# Patient Record
Sex: MALE | Race: WHITE | NOT HISPANIC OR LATINO | Employment: STUDENT | URBAN - METROPOLITAN AREA
[De-identification: names, ages, dates, MRNs, and addresses within clinical notes are randomized per-mention and may not be internally consistent; named-entity substitution may affect disease eponyms.]

---
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
## 2021-01-03 ENCOUNTER — HEALTH MAINTENANCE LETTER (OUTPATIENT)
Age: 21
End: 2021-01-03

## 2021-03-04 DIAGNOSIS — L30.4 INTERTRIGO: ICD-10-CM

## 2021-03-05 RX ORDER — TRIAMCINOLONE ACETONIDE 0.25 MG/G
CREAM TOPICAL 2 TIMES DAILY
Qty: 80 G | Refills: 0 | Status: SHIPPED | OUTPATIENT
Start: 2021-03-05 | End: 2021-06-21

## 2021-03-05 NOTE — TELEPHONE ENCOUNTER
Last Clinic Visit: 12/21/2020  [facility] [place]  derm Process #4 (RTC 3 M)

## 2021-03-31 PROBLEM — S29.011A STRAIN OF LEFT PECTORALIS MUSCLE: Status: RESOLVED | Noted: 2020-10-28 | Resolved: 2021-03-31

## 2021-06-21 ENCOUNTER — OFFICE VISIT (OUTPATIENT)
Dept: DERMATOLOGY | Facility: CLINIC | Age: 21
End: 2021-06-21
Payer: COMMERCIAL

## 2021-06-21 DIAGNOSIS — Z12.83 SKIN CANCER SCREENING: ICD-10-CM

## 2021-06-21 DIAGNOSIS — L70.0 ACNE VULGARIS: Primary | ICD-10-CM

## 2021-06-21 DIAGNOSIS — L30.4 INTERTRIGO: ICD-10-CM

## 2021-06-21 DIAGNOSIS — D22.9 MULTIPLE BENIGN NEVI: ICD-10-CM

## 2021-06-21 PROCEDURE — 99214 OFFICE O/P EST MOD 30 MIN: CPT | Performed by: PHYSICIAN ASSISTANT

## 2021-06-21 RX ORDER — CLINDAMYCIN PHOSPHATE 10 UG/ML
LOTION TOPICAL AT BEDTIME
Qty: 60 ML | Refills: 11 | Status: SHIPPED | OUTPATIENT
Start: 2021-06-21 | End: 2021-09-27

## 2021-06-21 RX ORDER — NYSTATIN 100000 U/G
OINTMENT TOPICAL 2 TIMES DAILY
Qty: 30 G | Refills: 11 | Status: SHIPPED | OUTPATIENT
Start: 2021-06-21

## 2021-06-21 RX ORDER — TRIAMCINOLONE ACETONIDE 0.25 MG/G
CREAM TOPICAL 2 TIMES DAILY
Qty: 80 G | Refills: 1 | Status: SHIPPED | OUTPATIENT
Start: 2021-06-21 | End: 2021-09-27

## 2021-06-21 ASSESSMENT — PAIN SCALES - GENERAL: PAINLEVEL: NO PAIN (0)

## 2021-06-21 NOTE — PROGRESS NOTES
[facility] Dermatology Record:  Clinic Visit In Office      Dermatology Problem List:  1. Acne vulgaris - mixed inflammatory and comedonal  -Start clindamycin 1% lotion daily to face chest and back  benzoyl peroxide 5% wash, tazarotene 0.1% cream    -s/p doxycycline 100 mg BID,  tazarotene 0.1% gel  S/p course of isotretinoin in high school  2.  History of tinea cruris - terbinafine 1% cream, terbinafine 250 mg daily x4 weeks  -past treatment: fluconazole 150mg qweek x2 weeks  3. Seborrheic dermatitis  -Head & Shoulders and Selsun Blue  4. Intertrigo- Nystatin cream bid and triamcinolone 0.025% cream bid prn   5.  Skin cancer screening 6/21/2021  Encounter Date: Jun 21, 2021    CC:   Chief Complaint   Patient presents with     Derm Problem     aniceto is coming in today for a follow up on acne, states that it has been getting better, also wanting to discuss jock itch as well       History of Present Illness:  Aniceto Issa is a 20 year old male who presents for a follow up of tinea cruris and acne vulgaris and a skin check. I last spoke with him virtually on 12/21/20 when I started dated on nystatin cream twice daily layered with triamcinolone 0.025% cream twice daily in areas in the groin as needed.  He states this helped take the itching away and resolved his symptoms sooner than any other cream or prescription that he has had in the past.  He states that the rashes come and go but when he uses these creams they go away quickly.  He would like refills of these.    For the acne he was prescribed Tazorac 0.1% cream.  He states this is working fairly well.  He continues to use a benzyl peroxide wash in the shower.  He still has some breakouts but is not too concerned about these.  He would like to avoid oral medication for now.    He would like a full skin check to evaluate his moles.  He denies any nevi that are changing, growing, bleeding or symptomatic.        ROS: Patient is generally feeling well today, no other  skin concerns. No rashes to other areas.     Physical Examination:  General: Well-appearing 20 year old male, appropriately-developed individual.  Skin:  Full skin, which includes the head/face, both arms, chest, back, abdomen,both legs, genitalia and/or groin buttocks, digits and/or nails, was examined.  -There are faint pink patches to inguinal folds without scale   -There are a few pink papules noted to the upper back/ posterior shoulders, cheeks  -Scattered round, brown symmetric macules and papules to the face, trunk and extremities.    Labs:  Liver Function Studies -   Recent Labs   Lab Test 11/09/20  0649   PROTTOTAL 7.4   ALBUMIN 4.1   BILITOTAL 0.3   ALKPHOS 74   AST 29   ALT 41         Past Medical History:   Patient Active Problem List   Diagnosis     Acne vulgaris     Past Medical History:   Diagnosis Date     Acne      No past surgical history on file.    Social History:  Patient reports that he has never smoked. He has never used smokeless tobacco.    Family History:  Family History   Problem Relation Age of Onset     Melanoma Maternal Grandfather      Skin Cancer No family hx of        Medications:  Current Outpatient Medications   Medication     benzoyl peroxide 5 % external liquid     nystatin (MYCOSTATIN) 115848 UNIT/GM external cream     tazarotene (TAZORAC) 0.1 % external cream     TAZORAC 0.1 % external gel     triamcinolone (KENALOG) 0.025 % cream     No current facility-administered medications for this visit.           No Known Allergies        Impression and Recommendations (Patient Counseled on the Following):  1.  Intertrigo, status post treatment of tinea cruris.  Consider this more intertrigo at this point with history of pruritus and irritation.  -Continue Nystatin cream bid   -Continue triamcinolone 0.025% cream bid only during flares of pruritus, up to 2 weeks at at time. Steroid ed given.    2. Acne vulgaris, primarily his back, a few areas on his face.   S/p course of isotretinoin  in high school and most recently doxycycline.   Prefers to use only topicals.  -Start clindamycin 1% lotion daily to the face, chest and back   -Continue benzoyl peroxide 5% wash daily  -Continue Tazarotene 0.1% cream nightly to the back.     3.  Skin cancer screening today with multiple benign pigmented nevi  -No concerning nevi noted on exam or on dermoscopy.  -ABCDs of melanoma were discussed and self skin checks were advised.   -Sun precaution was advised including the use of sun screens of SPF 30 or higher, sun protective clothing, and avoidance of tanning beds.    Follow-up:   Follow-up with dermatology annually if requiring refills, otherwise recommend f/u within 3 months. Pt defers close follow up at this time.    Staff only    All risks, benefits and alternatives were discussed with patient.  Patient is in agreement and understands the assessment and plan.  All questions were answered.  Sun Screen Education was given.   Return to Clinic annually or sooner as needed.   Rachel Soliz PA-C

## 2021-06-21 NOTE — PATIENT INSTRUCTIONS
SUN PROTECTION    WHY PROTECT AGAINST THE SUN?  In the past, sun exposure was thought to be a healthy benefit of outdoor activity. However, studies have shown many unhealthy effects of sun exposure, such as early aging of the skin and skin cancer.    WHAT KIND OF DAMAGE DOES THE SUN EXPOSURE CAUSE?  Part of the sun s energy that reaches earth is composed of rays of invisible ultraviolet (UV) light. When ultraviolet light rays (UVA and UVB) enter the skin, they damage skin cells, causing visible and invisible injuries.    Sunburn is a visible type of damage, which appears just a few hours after sun exposure. In many people this type of damage also causes tanning. Freckles, which occur in people with fair skin, are usually due to sun exposure. Freckles are nearly always a sign that sun damage has occurred, and therefore show the need for sun protection.    Ultraviolet light rays also cause invisible damage to skin cells. Some of the injury is repaired but some of the cell damage adds up year after year. After 20-30 years or more, the built-up damage appears as wrinkles, age spots and even skin cancer.  Although window glass blocks UVB light, UVA rays are able to penetrate through the glass.    HOW CAN I PROTECT MY CHILD FROM EXCESSIVE SUN EXPOSURE?  1. Avoidance. Plan your activities to avoid being in the sun in the middle of the day. Sun exposure is more intense closer to the equator, in the mountains and in the summer. The sun s damaging effects are increased by reflection from water, white sand and snow. Avoid long periods of direct sun exposure. Sit or play in the shade, especially when your shadow is shorter then you are tall. Stay out of the sun during peak hours of 10 am - 2 pm.   2. Use protective clothing.  Cover up with light colored clothing when outdoors including a hat to protect the scalp and face. In addition to filtering out the sun, tightly woven clothing reflects heat and helps keep you feeling cool.  Sunglasses that block ultraviolet rays protect the eyes and eyelids. Multiple retailers now sell clothing and swimwear for adults and children that is made of special fabric that protects against the sun.    3. Apply a broad-spectrum UVA and UVB sunscreen with an SPF of 30 of higher and reapply approximately every two hours, even on cloudy days. If swimming or participating in intense physical activity, sunscreen may need to be applied more often.   4. Infants should be kept out of direct sun and be covered by protective clothing when possible. If sun exposure is unavoidable, sunscreen should be applied to exposed areas (i.e. face, hands).    IS SUNSCREEN SAFE?  Hats, clothing and shade are the most reliable forms of sun protection, but sunscreen is also an important part of protecting your child from the sun. Some have raised concerns about chemical sunscreens and the dangers of absorption. Most of this concern is theoretical, and our providers would be happy to discuss this with you.  Most dermatologists agree that the risk of unprotected sun exposure far outweighs the theoretical risks of sunscreens.      WHAT IF I HAVE AN INFANT OR YOUNG CHILD WITH SENSITIVE SKIN?  The following sunscreens may be better for your child s sensitive skin. The main active ingredients are inert, either titanium dioxide or zinc oxide. These ingredients are less irritating than chemical sunscreens.   Be wary of the word  baby  or  organic : these words don t always mean that the product is hypoallergenic.  Please also note that this list is not all-inclusive, and that we do not formally endorse any of these products.     Aveeno Active Natural Protection Mineral Block Lotion SPF 30  Aveeno Baby Natural Protection Face Stick SPF 50+  Banana Boat Natural Reflect (baby or kids) SPF 50+  Bare Republic SPR 50 Stick   Beauty Countersun Mineral Sunscreen Stick SPF 30  Wallace s Bees Chemical-Free Sunscreen SPF 30  Blue Lizard Baby SPF 30+  Blue  Lizard for Sensitive Skin SPF 30+  Cotz Pediatric Pure SPF 30  Cotz Pediatric Face SPF 40  Cotz 20% Zinc SPF 35  CVS Sensitive Skin 30  CVS Baby Lotion Sunscreen SPF 60+  EltaMD UV Physical Broad-Spectrum SPF 41  La Roche-Posay Anthelios Mineral Zinc Oxide Sunscreen SPF 50  Mustella Broad Spectrum SPF 50+/Mineral Sunscreen Stick  Neutrogena Sensitive Skin- Pure and Free Baby SPF 30  Neutrogena Sensitive Skin-Pure and Free Baby  SPF 50+  Neutrogena Sheer Zinc Oxide Dry-Touch Face Sunscreen with Broad Spectrum SPF 50, Oil-Free, Non-Comedogenic & Non-Greasy Mineral Sunscreen  Thinkbaby Safe Sunscreen SPF 50+,   Thinksport Sunscreen SPF 50+,   PreSun Sensitive Sunblock SPF 28  Vanicream Sunscreen for Sensitive Skin SPF 30 or 50  [employer]green s Sensitive Skin SPF 70    WHERE CAN I BUY SUN PROTECTIVE CLOTHING AND SWIMWEAR?   Many retailers sell these products.  Coolibar, Solumbra, Sunday Afternoons, and Athleta are some examples.  Many other popular children s brands have started selling UV protective swimwear, and we recommend swimsuits that include swim shirts and don t leave extra skin exposed.   UV protective products can also be washed into clothing (eg: Rit Sun Guard Laundry UV Protectant).     SHOULD I WORRY ABOUT MY CHILD NOT GETTING ENOUGH VITAMIN D?  Vitamin D is essential for many processes in the body, and it is important for bone growth in children.  But while the sun is one source of vitamin D, it is also the source of harmful ultraviolet radiation resulting in thousands of skin cancers each year. The official recommendation of the American Academy of Dermatology (AAD) is that vitamin D should be obtained through dietary sources and supplementation rather than from sunlight.     For more information on sun safety and more FAQs about sun protection, visit:  http://www.aad.org/media-resources/stats-and-facts/prevention-and-care/sunscreens

## 2021-06-21 NOTE — NURSING NOTE
Dermatology Rooming Note    Aniceto Issa's goals for this visit include:   Chief Complaint   Patient presents with     Derm Problem     aniceto is coming in today for a follow up on acne, states that it has been getting better, also wanting to discuss jock itch as well     Emerita Alvarez CMA on 6/21/2021 at 11:11 AM

## 2021-06-21 NOTE — LETTER
6/21/2021       RE: Aniceto Issa  [address] [id number]     Dear Colleague,    Thank you for referring your patient, Aniceto Issa, to the [facility] [place] at [facility]. Please see a copy of my visit note below.    [facility] Dermatology Record:  Clinic Visit In Office      Dermatology Problem List:  1. Acne vulgaris - mixed inflammatory and comedonal  -Start clindamycin 1% lotion daily to face chest and back  benzoyl peroxide 5% wash, tazarotene 0.1% cream    -s/p doxycycline 100 mg BID,  tazarotene 0.1% gel  S/p course of isotretinoin in high school  2.  History of tinea cruris - terbinafine 1% cream, terbinafine 250 mg daily x4 weeks  -past treatment: fluconazole 150mg qweek x2 weeks  3. Seborrheic dermatitis  -Head & Shoulders and Selsun Blue  4. Intertrigo- Nystatin cream bid and triamcinolone 0.025% cream bid prn   5.  Skin cancer screening 6/21/2021  Encounter Date: Jun 21, 2021    CC:   Chief Complaint   Patient presents with     Derm Problem     aniceto is coming in today for a follow up on acne, states that it has been getting better, also wanting to discuss jock itch as well       History of Present Illness:  Aniceto Issa is a 20 year old male who presents for a follow up of tinea cruris and acne vulgaris and a skin check. I last spoke with him virtually on 12/21/20 when I started dated on nystatin cream twice daily layered with triamcinolone 0.025% cream twice daily in areas in the groin as needed.  He states this helped take the itching away and resolved his symptoms sooner than any other cream or prescription that he has had in the past.  He states that the rashes come and go but when he uses these creams they go away quickly.  He would like refills of these.    For the acne he was prescribed Tazorac 0.1% cream.  He states this is working fairly well.  He continues to use a benzyl peroxide wash in the shower.   He still has some breakouts but is not too concerned about these.  He would like to avoid oral medication for now.    He would like a full skin check to evaluate his moles.  He denies any nevi that are changing, growing, bleeding or symptomatic.        ROS: Patient is generally feeling well today, no other skin concerns. No rashes to other areas.     Physical Examination:  General: Well-appearing 20 year old male, appropriately-developed individual.  Skin:  Full skin, which includes the head/face, both arms, chest, back, abdomen,both legs, genitalia and/or groin buttocks, digits and/or nails, was examined.  -There are faint pink patches to inguinal folds without scale   -There are a few pink papules noted to the upper back/ posterior shoulders, cheeks  -Scattered round, brown symmetric macules and papules to the face, trunk and extremities.    Labs:  Liver Function Studies -   Recent Labs   Lab Test 11/09/20  0649   PROTTOTAL 7.4   ALBUMIN 4.1   BILITOTAL 0.3   ALKPHOS 74   AST 29   ALT 41         Past Medical History:   Patient Active Problem List   Diagnosis     Acne vulgaris     Past Medical History:   Diagnosis Date     Acne      No past surgical history on file.    Social History:  Patient reports that he has never smoked. He has never used smokeless tobacco.    Family History:  Family History   Problem Relation Age of Onset     Melanoma Maternal Grandfather      Skin Cancer No family hx of        Medications:  Current Outpatient Medications   Medication     benzoyl peroxide 5 % external liquid     nystatin (MYCOSTATIN) 994649 UNIT/GM external cream     tazarotene (TAZORAC) 0.1 % external cream     TAZORAC 0.1 % external gel     triamcinolone (KENALOG) 0.025 % cream     No current facility-administered medications for this visit.           No Known Allergies        Impression and Recommendations (Patient Counseled on the Following):  1.  Intertrigo, status post treatment of tinea cruris.  Consider this more  intertrigo at this point with history of pruritus and irritation.  -Continue Nystatin cream bid   -Continue triamcinolone 0.025% cream bid only during flares of pruritus, up to 2 weeks at at time. Steroid ed given.    2. Acne vulgaris, primarily his back, a few areas on his face.   S/p course of isotretinoin in high school and most recently doxycycline.   Prefers to use only topicals.  -Start clindamycin 1% lotion daily to the face, chest and back   -Continue benzoyl peroxide 5% wash daily  -Continue Tazarotene 0.1% cream nightly to the back.     3.  Skin cancer screening today with multiple benign pigmented nevi  -No concerning nevi noted on exam or on dermoscopy.  -ABCDs of melanoma were discussed and self skin checks were advised.   -Sun precaution was advised including the use of sun screens of SPF 30 or higher, sun protective clothing, and avoidance of tanning beds.    Follow-up:   Follow-up with dermatology annually if requiring refills, otherwise recommend f/u within 3 months. Pt defers close follow up at this time.    Staff only    All risks, benefits and alternatives were discussed with patient.  Patient is in agreement and understands the assessment and plan.  All questions were answered.  Sun Screen Education was given.   Return to Clinic annually or sooner as needed.   Rachel Soliz PA-C

## 2021-09-26 ENCOUNTER — TELEPHONE (OUTPATIENT)
Dept: FAMILY MEDICINE | Facility: CLINIC | Age: 21
End: 2021-09-26

## 2021-09-26 NOTE — TELEPHONE ENCOUNTER
Patient made a appt on [facility] for 9-28-21. Would you like to triage before he comes in?    Mild discomfort in chest area over the past week, likely muscular but could be something else. Prefer to get it checked out though just to be safe.

## 2021-09-27 ENCOUNTER — OFFICE VISIT (OUTPATIENT)
Dept: INTERNAL MEDICINE | Facility: CLINIC | Age: 21
End: 2021-09-27
Payer: COMMERCIAL

## 2021-09-27 VITALS
TEMPERATURE: 98 F | SYSTOLIC BLOOD PRESSURE: 144 MMHG | OXYGEN SATURATION: 100 % | WEIGHT: 229 LBS | DIASTOLIC BLOOD PRESSURE: 78 MMHG | HEART RATE: 85 BPM | BODY MASS INDEX: 32.86 KG/M2

## 2021-09-27 DIAGNOSIS — R07.9 CHEST PAIN, UNSPECIFIED TYPE: Primary | ICD-10-CM

## 2021-09-27 DIAGNOSIS — Z20.822 ENCOUNTER FOR LABORATORY TESTING FOR COVID-19 VIRUS: ICD-10-CM

## 2021-09-27 LAB
ALBUMIN SERPL-MCNC: 4.5 G/DL (ref 3.4–5)
ALP SERPL-CCNC: 85 U/L (ref 40–150)
ALT SERPL W P-5'-P-CCNC: 66 U/L (ref 0–70)
ANION GAP SERPL CALCULATED.3IONS-SCNC: 2 MMOL/L (ref 3–14)
AST SERPL W P-5'-P-CCNC: 47 U/L (ref 0–45)
BILIRUB SERPL-MCNC: 0.4 MG/DL (ref 0.2–1.3)
BUN SERPL-MCNC: 21 MG/DL (ref 7–30)
CALCIUM SERPL-MCNC: 8.9 MG/DL (ref 8.5–10.1)
CHLORIDE BLD-SCNC: 106 MMOL/L (ref 94–109)
CO2 SERPL-SCNC: 27 MMOL/L (ref 20–32)
CREAT SERPL-MCNC: 1.01 MG/DL (ref 0.66–1.25)
ERYTHROCYTE [DISTWIDTH] IN BLOOD BY AUTOMATED COUNT: 12.7 % (ref 10–15)
GFR SERPL CREATININE-BSD FRML MDRD: >90 ML/MIN/1.73M2
GLUCOSE BLD-MCNC: 103 MG/DL (ref 70–99)
HCT VFR BLD AUTO: 44.9 % (ref 40–53)
HGB BLD-MCNC: 15.3 G/DL (ref 13.3–17.7)
MCH RBC QN AUTO: 28.4 PG (ref 26.5–33)
MCHC RBC AUTO-ENTMCNC: 34.1 G/DL (ref 31.5–36.5)
MCV RBC AUTO: 84 FL (ref 78–100)
PLATELET # BLD AUTO: 266 10E3/UL (ref 150–450)
POTASSIUM BLD-SCNC: 4.2 MMOL/L (ref 3.4–5.3)
PROT SERPL-MCNC: 8.1 G/DL (ref 6.8–8.8)
RBC # BLD AUTO: 5.38 10E6/UL (ref 4.4–5.9)
SARS-COV-2 RNA RESP QL NAA+PROBE: NEGATIVE
SODIUM SERPL-SCNC: 135 MMOL/L (ref 133–144)
TROPONIN I SERPL-MCNC: <0.015 UG/L (ref 0–0.04)
TSH SERPL DL<=0.005 MIU/L-ACNC: 1.26 MU/L (ref 0.4–4)
WBC # BLD AUTO: 6.3 10E3/UL (ref 4–11)

## 2021-09-27 PROCEDURE — 99214 OFFICE O/P EST MOD 30 MIN: CPT | Performed by: INTERNAL MEDICINE

## 2021-09-27 PROCEDURE — U0003 INFECTIOUS AGENT DETECTION BY NUCLEIC ACID (DNA OR RNA); SEVERE ACUTE RESPIRATORY SYNDROME CORONAVIRUS 2 (SARS-COV-2) (CORONAVIRUS DISEASE [COVID-19]), AMPLIFIED PROBE TECHNIQUE, MAKING USE OF HIGH THROUGHPUT TECHNOLOGIES AS DESCRIBED BY CMS-2020-01-R: HCPCS | Performed by: INTERNAL MEDICINE

## 2021-09-27 PROCEDURE — 84484 ASSAY OF TROPONIN QUANT: CPT | Performed by: INTERNAL MEDICINE

## 2021-09-27 PROCEDURE — 80053 COMPREHEN METABOLIC PANEL: CPT | Performed by: INTERNAL MEDICINE

## 2021-09-27 PROCEDURE — 84443 ASSAY THYROID STIM HORMONE: CPT | Performed by: INTERNAL MEDICINE

## 2021-09-27 PROCEDURE — 85027 COMPLETE CBC AUTOMATED: CPT | Performed by: INTERNAL MEDICINE

## 2021-09-27 PROCEDURE — U0005 INFEC AGEN DETEC AMPLI PROBE: HCPCS | Performed by: INTERNAL MEDICINE

## 2021-09-27 PROCEDURE — 93000 ELECTROCARDIOGRAM COMPLETE: CPT | Performed by: INTERNAL MEDICINE

## 2021-09-27 PROCEDURE — 36415 COLL VENOUS BLD VENIPUNCTURE: CPT | Performed by: INTERNAL MEDICINE

## 2021-09-27 NOTE — PATIENT INSTRUCTIONS
- I will send you a message on [employer] when I am able to look at the results of your tests from today  - Go to ER if this worsens

## 2021-09-27 NOTE — PROGRESS NOTES
Assessment & Plan     Chest pain, unspecified type  DDX includes cardiac disease vs MSK vs pulm vs other. EKG not 100% normal today with V1 and V2 showing what appears to be J-point elevation. Discussed this finding with Aniceto today who was understandably concerned about an EKG abnormality. Reassured him that J-point elevation can be a normal finding particularly in young, healthy males such as Aniceto. Offered to check troponin to ensure he's not having active cardiac ischemia which he preferred to pursue. Discussed ER precautions. Will also check other basic labs given report of some palpitations. I think this is most likely MSK pain in a very active young man but will do due diligence here.  - EKG 12-lead complete w/read - Clinics  - CBC with platelets  - Troponin I  - TSH with free T4 reflex  - Comprehensive metabolic panel    Encounter for laboratory testing for COVID-19 virus  Aniceto is quite concerned this could be COVID. He is fully vaccinated. He is traveling on Thursday and would like to be sure. COVID-19 PCR test collected by [facility] staff. I do not expect this is COVID-induced myocarditis given the lack of EKG findings for myocarditis.  - Asymptomatic COVID-19 Virus (Coronavirus) by PCR Nasopharyngeal    Return in about 6 months (around 3/27/2022) for Physical Exam.    Jeremy Perez MD  [facility]   Aniceto is a 21 year old who presents for the following health issues:    Chest Pain  Onset/Duration: 4-5 days  Description:   Location: left side  Character: achey  Radiation: sometimes to R side  Duration: many hours     Intensity: mild  Progression of Symptoms: intermittent  Accompanying Signs & Symptoms:  Shortness of breath: no  Sweating: no  Nausea/vomiting: no  Lightheadedness: no  Palpitations: once or twice  Fever/Chills: no  Cough: no           Heartburn: yes  History:   Family history of heart disease: no  Tobacco use: no  Previous similar symptoms: no    Precipitating factors:   Worse with exertion: no  Worse with deep breaths: no           Related to eating: no           Better with burping: no  Therapies tried and outcome: nothing     Never had anything like this before. No FH of cardiac disease. Pain present during exam. Reproducible with palpation over L lateral rib cage but also present over L anterior chest. He's concerned about COVID.    Review of Systems   Constitutional, HEENT, cardiovascular, pulmonary, MSK systems are negative, except as otherwise noted.      Objective    BP (!) 144/78   Pulse 85   Temp 98  F (36.7  C) (Tympanic)   Wt 103.9 kg (229 lb)   SpO2 100%   BMI 32.86 kg/m    Body mass index is 32.86 kg/m .     Physical Exam   GENERAL: alert and in no distress.  EYES: conjunctivae/corneas clear. EOMs grossly intact  HENT: NC/AT, facies symmetric.  RESP: CTAB, no w/r/r.  CV: RRR, no m/r/g.  GI: NT, ND  MSK: Some mild TTP over L lateral rib cage. Moves all four extremities freely.  SKIN: No significant ulcers, lesions, or rashes on the visualized portions of the skin  NEURO: Alert. Oriented.

## 2021-10-10 ENCOUNTER — HEALTH MAINTENANCE LETTER (OUTPATIENT)
Age: 21
End: 2021-10-10

## 2022-01-05 ENCOUNTER — OFFICE VISIT (OUTPATIENT)
Dept: DERMATOLOGY | Facility: CLINIC | Age: 22
End: 2022-01-05
Payer: COMMERCIAL

## 2022-01-05 VITALS
HEART RATE: 84 BPM | WEIGHT: 229 LBS | SYSTOLIC BLOOD PRESSURE: 129 MMHG | DIASTOLIC BLOOD PRESSURE: 61 MMHG | BODY MASS INDEX: 32.86 KG/M2

## 2022-01-05 DIAGNOSIS — L70.0 ACNE VULGARIS: Primary | ICD-10-CM

## 2022-01-05 DIAGNOSIS — L30.9 DERMATITIS: ICD-10-CM

## 2022-01-05 DIAGNOSIS — L30.4 INTERTRIGO: ICD-10-CM

## 2022-01-05 PROCEDURE — 88312 SPECIAL STAINS GROUP 1: CPT | Performed by: DERMATOLOGY

## 2022-01-05 PROCEDURE — 99213 OFFICE O/P EST LOW 20 MIN: CPT | Mod: 25 | Performed by: PHYSICIAN ASSISTANT

## 2022-01-05 PROCEDURE — 11102 TANGNTL BX SKIN SINGLE LES: CPT | Performed by: PHYSICIAN ASSISTANT

## 2022-01-05 PROCEDURE — 88305 TISSUE EXAM BY PATHOLOGIST: CPT | Performed by: DERMATOLOGY

## 2022-01-05 RX ORDER — TAZAROTENE 1 MG/G
GEL TOPICAL
Qty: 30 G | Refills: 4 | Status: SHIPPED | OUTPATIENT
Start: 2022-01-05

## 2022-01-05 NOTE — LETTER
1/5/2022         RE: Aniceto Issa  [address]  [place] [address]        Dear Colleague,    Thank you for referring your patient, Aniceto Issa, to the [facility]. Please see a copy of my visit note below.    Aniceto Issa is an extremely pleasant 21 year old year old male patient here today for rash on groin. Present for many years. He has tried fluconazole, oral lamisil, topical lamisil, nystatin and triamcinolone. He notes at one time it resolved for a few weeks but then returned. He reports occasionally itchy, he notes triamcinolone does help with the itch. He has had similar rash on armpits in the past, but has not had the return for years. He also notes acne is doing well must uses tazorac.  Patient has no other skin complaints today.  Remainder of the HPI, Meds, PMH, Allergies, FH, and SH was reviewed in chart.    Past Medical History:   Diagnosis Date     Acne        No past surgical history on file.     Family History   Problem Relation Age of Onset     Melanoma Maternal Grandfather      Skin Cancer No family hx of        Social History     Socioeconomic History     Marital status: Single     Spouse name: Not on file     Number of children: Not on file     Years of education: Not on file     Highest education level: Not on file   Occupational History     Not on file   Tobacco Use     Smoking status: Never Smoker     Smokeless tobacco: Never Used   Substance and Sexual Activity     Alcohol use: Not on file     Drug use: Not on file     Sexual activity: Not on file   Other Topics Concern     Not on file   Social History Narrative     Not on file     Social Determinants of Health     Financial Resource Strain: Not on file   Food Insecurity: Not on file   Transportation Needs: Not on file   Physical Activity: Not on file   Stress: Not on file   Social Connections: Not on file   Intimate Partner Violence: Not on file   Housing Stability: Not on file        Outpatient Encounter Medications as of 1/5/2022   Medication Sig Dispense Refill     nystatin (MYCOSTATIN) 083741 UNIT/GM external ointment Apply topically 2 times daily To areas of rash until clear. 30 g 11     tazarotene (TAZORAC) 0.1 % external gel Apply pea sized amount at bedtime. 30 g 4     No facility-administered encounter medications on file as of 1/5/2022.             O:   NAD, WDWN, Alert & Oriented, Mood & Affect wnl, Vitals stable   Here today alone   /61   Pulse 84   Wt 103.9 kg (229 lb)   BMI 32.86 kg/m     General appearance normal   Vitals stable   Alert, oriented and in no acute distress     Pink scaly plaques on right and left groin, no satellite lesions   Rare inflammatory papules on face     Eyes: Conjunctivae/lids:Normal     ENT: Lipsnormal    MSK:Normal    Pulm: Breathing Normal    Neuro/Psych: Orientation:Alert and Orientedx3 ; Mood/Affect:normal   A/P:  1. R/O inverse psoriasis vs tinea, favor psoriasis  TANGENTIAL BIOPSY SENT OUT:  After consent, anesthesia with LEC and prep, tangential excision performed and specimen sent out for permanent section histology.  No complications and routine wound care. Patient told to call our office in 1-2 weeks for result.    If consistent with psoriasis will send in stronger topical steroid and dovonex.     2. Acne Vulgaris   Continue tazorac gel.       Again, thank you for allowing me to participate in the care of your patient.        Sincerely,        Danitza Rubio PA-C

## 2022-01-05 NOTE — PROGRESS NOTES
Aniceto Issa is an extremely pleasant 21 year old year old male patient here today for rash on groin. Present for many years. He has tried fluconazole, oral lamisil, topical lamisil, nystatin and triamcinolone. He notes at one time it resolved for a few weeks but then returned. He reports occasionally itchy, he notes triamcinolone does help with the itch. He has had similar rash on armpits in the past, but has not had the return for years. He also notes acne is doing well must uses tazorac.  Patient has no other skin complaints today.  Remainder of the HPI, Meds, PMH, Allergies, FH, and SH was reviewed in chart.    Past Medical History:   Diagnosis Date     Acne        No past surgical history on file.     Family History   Problem Relation Age of Onset     Melanoma Maternal Grandfather      Skin Cancer No family hx of        Social History     Socioeconomic History     Marital status: Single     Spouse name: Not on file     Number of children: Not on file     Years of education: Not on file     Highest education level: Not on file   Occupational History     Not on file   Tobacco Use     Smoking status: Never Smoker     Smokeless tobacco: Never Used   Substance and Sexual Activity     Alcohol use: Not on file     Drug use: Not on file     Sexual activity: Not on file   Other Topics Concern     Not on file   Social History Narrative     Not on file     Social Determinants of Health     Financial Resource Strain: Not on file   Food Insecurity: Not on file   Transportation Needs: Not on file   Physical Activity: Not on file   Stress: Not on file   Social Connections: Not on file   Intimate Partner Violence: Not on file   Housing Stability: Not on file       Outpatient Encounter Medications as of 1/5/2022   Medication Sig Dispense Refill     nystatin (MYCOSTATIN) 647457 UNIT/GM external ointment Apply topically 2 times daily To areas of rash until clear. 30 g 11     tazarotene (TAZORAC) 0.1 % external gel Apply pea  sized amount at bedtime. 30 g 4     No facility-administered encounter medications on file as of 1/5/2022.             O:   NAD, WDWN, Alert & Oriented, Mood & Affect wnl, Vitals stable   Here today alone   /61   Pulse 84   Wt 103.9 kg (229 lb)   BMI 32.86 kg/m     General appearance normal   Vitals stable   Alert, oriented and in no acute distress     Pink scaly plaques on right and left groin, no satellite lesions   Rare inflammatory papules on face     Eyes: Conjunctivae/lids:Normal     ENT: Lipsnormal    MSK:Normal    Pulm: Breathing Normal    Neuro/Psych: Orientation:Alert and Orientedx3 ; Mood/Affect:normal   A/P:  1. R/O inverse psoriasis vs tinea, favor psoriasis  TANGENTIAL BIOPSY SENT OUT:  After consent, anesthesia with LEC and prep, tangential excision performed and specimen sent out for permanent section histology.  No complications and routine wound care. Patient told to call our office in 1-2 weeks for result.    If consistent with psoriasis will send in stronger topical steroid and dovonex.     2. Acne Vulgaris   Continue tazorac gel.

## 2022-01-12 LAB
PATH REPORT.COMMENTS IMP SPEC: NORMAL
PATH REPORT.FINAL DX SPEC: NORMAL
PATH REPORT.GROSS SPEC: NORMAL
PATH REPORT.MICROSCOPIC SPEC OTHER STN: NORMAL
PATH REPORT.RELEVANT HX SPEC: NORMAL

## 2022-01-14 RX ORDER — TRIAMCINOLONE ACETONIDE 1 MG/G
OINTMENT TOPICAL
Qty: 30 G | Refills: 1 | Status: SHIPPED | OUTPATIENT
Start: 2022-01-14

## 2022-01-30 ENCOUNTER — HEALTH MAINTENANCE LETTER (OUTPATIENT)
Age: 22
End: 2022-01-30

## 2022-06-30 ENCOUNTER — LAB (OUTPATIENT)
Dept: URGENT CARE | Facility: URGENT CARE | Age: 22
End: 2022-06-30
Attending: FAMILY MEDICINE
Payer: COMMERCIAL

## 2022-06-30 DIAGNOSIS — Z20.822 SUSPECTED 2019 NOVEL CORONAVIRUS INFECTION: ICD-10-CM

## 2022-06-30 PROCEDURE — U0003 INFECTIOUS AGENT DETECTION BY NUCLEIC ACID (DNA OR RNA); SEVERE ACUTE RESPIRATORY SYNDROME CORONAVIRUS 2 (SARS-COV-2) (CORONAVIRUS DISEASE [COVID-19]), AMPLIFIED PROBE TECHNIQUE, MAKING USE OF HIGH THROUGHPUT TECHNOLOGIES AS DESCRIBED BY CMS-2020-01-R: HCPCS

## 2022-06-30 PROCEDURE — U0005 INFEC AGEN DETEC AMPLI PROBE: HCPCS

## 2022-07-01 LAB — SARS-COV-2 RNA RESP QL NAA+PROBE: POSITIVE

## 2022-09-18 ENCOUNTER — HEALTH MAINTENANCE LETTER (OUTPATIENT)
Age: 22
End: 2022-09-18

## 2023-05-07 ENCOUNTER — HEALTH MAINTENANCE LETTER (OUTPATIENT)
Age: 23
End: 2023-05-07

## 2023-05-11 ENCOUNTER — OFFICE VISIT (OUTPATIENT)
Dept: DERMATOLOGY | Facility: CLINIC | Age: 23
End: 2023-05-11
Payer: COMMERCIAL

## 2023-05-11 DIAGNOSIS — L40.9 PSORIASIS: Primary | ICD-10-CM

## 2023-05-11 DIAGNOSIS — L21.9 DERMATITIS, SEBORRHEIC: ICD-10-CM

## 2023-05-11 PROCEDURE — 99214 OFFICE O/P EST MOD 30 MIN: CPT | Performed by: NURSE PRACTITIONER

## 2023-05-11 RX ORDER — KETOCONAZOLE 20 MG/G
CREAM TOPICAL
Qty: 60 G | Refills: 11 | Status: SHIPPED | OUTPATIENT
Start: 2023-05-11

## 2023-05-11 RX ORDER — PIMECROLIMUS 10 MG/G
CREAM TOPICAL 2 TIMES DAILY
Qty: 60 G | Refills: 11 | Status: SHIPPED | OUTPATIENT
Start: 2023-05-11

## 2023-05-11 RX ORDER — TRIAMCINOLONE ACETONIDE 1 MG/G
CREAM TOPICAL 2 TIMES DAILY
Qty: 45 G | Refills: 1 | Status: SHIPPED | OUTPATIENT
Start: 2023-05-11 | End: 2024-02-23

## 2023-05-11 NOTE — PATIENT INSTRUCTIONS
Start the triamcinolone cream twice daily for 2 weeks to affected area in genitals, then transition onto the pimecrolimus cream ongoing twice daily until resolved then stop. Restart with flares. Start ketoconazole cream once daily to the face and genitals where rash is present. If this goes away then you may want to use it twice weekly (or more if needed) to keep them from flaring.

## 2023-05-11 NOTE — Clinical Note
5/11/2023         RE: Aniceto Issa  [address]  [place] [address]        Dear Colleague,    Thank you for referring your patient, Aniceto Issa, to the [facility]. Please see a copy of my visit note below.    [facility] Dermatology Note  Encounter Date: May 11, 2023  Office Visit     Reviewed patients past medical history and pertinent chart review prior to patients visit today.     Dermatology Problem List:  Genital Biopsy taken on 1/5/2022 indicating intertrigo versus psoriasis. Currently using ketoconazole 2% cream and triamcinolone 0.1% ointment (for 2 weeks) then will transition to pimecrolimus cream until resolved and PRN flares.   Seb derm, face and scalp. Ketoconazole 2% cream and pimecrolimus cream   ____________________________________________    Assessment & Plan:     # Psoriasis   - Start the triamcinolone 0.1% cream twice daily for 2 weeks to affected area in genitals, then transition onto the pimecrolimus 1%  cream ongoing twice daily until resolved then stop. Restart with flares. Start ketoconazole 2% cream once daily to the facial rash and genitals where rash is present. If this goes away then you may want to use it twice weekly (or more if needed) to keep them from flaring.       # Seborrheic dermatitis, face and scalp.   -Discussed that this is a recurring rash for most people and will need some maintenance even after rash has resolved. Given prescription for ketoconazole 2% cream and ketconazole 2% shampoo to use daily while rash is present, and ongoing 1-3 times weekly when rash is well controlled. Advised to leave shampoo on scalp for 2-5 minutes before rinsing.  Pimecrolimus 1% cream 1-2 times daily with flares of rash only.     Patient will be in [place] for football within the next 6 weeks.  Discussed that if he does not see improvement, he should follow-up with a dermatologist in [place] in 3 months.     Written by  Alejandrina Olivo working as a scribe for MARLO Dangelo CNP on 05/11/23    Keyana Byrne CNP  Dermatology   _______________________________________    CC: Psoriasis (Groin area- not on current treatment )    HPI:  Mr. Aniceto Issa is a(n) 22 year old male who presents today as a return patient for evaluation of the rash on the genital region.  He reports that he initially started to have a recurrent rash to the genital region his Freshman year of college.  It was thought to be a fungal infection.  He was given oral fluconazole which did help to resolve the rash quickly.  Then in the spring of 2020, the rash reocurred.  He tried using his left over fluconazole and a ketoconazole cream which did not help.  He decided to live with this for a while.  Last year on 1/5/2022 he underwent a biopsy to the area and which findings present favored to represent intertrigo, however, since intertriginous psoriasis is notorious for its inclusion of spongiosis, it could not be entirely excluded though it is disfavored.  He has noticed some scaling and erythema to the bilateral eyebrows.  He reports that it will occasionally occur to the medial nose and cheeks.  He reports that he can have some dandruff to the scalp which is controled with head and shoulder.     Patient is otherwise feeling well, without additional skin concerns.    Physical Exam:  SKIN: Focused examination of fingernails, genitals, and buttock was performed.  - Erythema from the mons pubis extending to the shaft of the penis and extending throughout the perineum to the superior intergluteal crease.  - Erythema and scaling of the bilateral eyebrows.    - Mild erythema over the nose and cheeks.     - Not pitting or ridging of the nails.   - No other lesions of concern on areas examined.     Medications:  Current Outpatient Medications   Medication     nystatin (MYCOSTATIN) 081512 UNIT/GM external ointment     tazarotene (TAZORAC) 0.1 % external gel      triamcinolone (KENALOG) 0.1 % external ointment     No current facility-administered medications for this visit.      Past Medical History:   Patient Active Problem List   Diagnosis     Acne vulgaris     Past Medical History:   Diagnosis Date     Acne        CC Referred Self, MD  No address on file on close of this encounter.         Again, thank you for allowing me to participate in the care of your patient.        Sincerely,        MARLO Henry CNP

## 2023-05-11 NOTE — PROGRESS NOTES
[facility] Dermatology Note  Encounter Date: May 11, 2023  Office Visit     Reviewed patients past medical history and pertinent chart review prior to patients visit today.     Dermatology Problem List:  Genital Biopsy taken on 1/5/2022 indicating intertrigo versus psoriasis. Currently using ketoconazole 2% cream and triamcinolone 0.1% ointment (for 2 weeks) then will transition to pimecrolimus cream until resolved and PRN flares.   Seb derm, face and scalp. Ketoconazole 2% cream and pimecrolimus cream   ____________________________________________    Assessment & Plan:     # Psoriasis   - Start the triamcinolone 0.1% cream twice daily for 2 weeks to affected area in genitals, then transition onto the pimecrolimus 1%  cream ongoing twice daily until resolved then stop. Restart with flares. Start ketoconazole 2% cream once daily to the facial rash and genitals where rash is present. If this goes away then you may want to use it twice weekly (or more if needed) to keep them from flaring.       # Seborrheic dermatitis, face and scalp.   -Discussed that this is a recurring rash for most people and will need some maintenance even after rash has resolved. Given prescription for ketoconazole 2% cream and ketconazole 2% shampoo to use daily while rash is present, and ongoing 1-3 times weekly when rash is well controlled. Advised to leave shampoo on scalp for 2-5 minutes before rinsing.  Pimecrolimus 1% cream 1-2 times daily with flares of rash only.     Patient will be in [place] for football within the next 6 weeks.  Discussed that if he does not see improvement, he should follow-up with a dermatologist in [place] in 3 months.     Written by Alejandrina Olivo working as a scribe for MARLO Dangelo CNP on 05/11/23    Keyana Byrne CNP  Dermatology   _______________________________________    CC: Psoriasis (Groin area- not on current treatment )    HPI:  Mr. Aniceto Issa is a(n) 22 year old male  who presents today as a return patient for evaluation of the rash on the genital region.  He reports that he initially started to have a recurrent rash to the genital region his Freshman year of college.  It was thought to be a fungal infection.  He was given oral fluconazole which did help to resolve the rash quickly.  Then in the spring of 2020, the rash reocurred.  He tried using his left over fluconazole and a ketoconazole cream which did not help.  He decided to live with this for a while.  Last year on 1/5/2022 he underwent a biopsy to the area and which findings present favored to represent intertrigo, however, since intertriginous psoriasis is notorious for its inclusion of spongiosis, it could not be entirely excluded though it is disfavored.  He has noticed some scaling and erythema to the bilateral eyebrows.  He reports that it will occasionally occur to the medial nose and cheeks.  He reports that he can have some dandruff to the scalp which is controled with head and shoulder.     Patient is otherwise feeling well, without additional skin concerns.    Physical Exam:  SKIN: Focused examination of fingernails, genitals, and buttock was performed.  - Erythema from the mons pubis extending to the shaft of the penis and extending throughout the perineum to the superior intergluteal crease.  - Erythema and scaling of the bilateral eyebrows.    - Mild erythema over the nose and cheeks.     - Not pitting or ridging of the nails.   - No other lesions of concern on areas examined.     Medications:  Current Outpatient Medications   Medication     nystatin (MYCOSTATIN) 847931 UNIT/GM external ointment     tazarotene (TAZORAC) 0.1 % external gel     triamcinolone (KENALOG) 0.1 % external ointment     No current facility-administered medications for this visit.      Past Medical History:   Patient Active Problem List   Diagnosis     Acne vulgaris     Past Medical History:   Diagnosis Date     Acne        CC Referred  Self, MD  No address on file on close of this encounter.

## 2024-01-22 NOTE — PROGRESS NOTES
[facility] Dermatology Record:  Store and Forward and Video ( [facility] )      Dermatology Problem List:  1. Acne vulgaris - mixed inflammatory and comedonal  -doxycycline 100mg BID, benzoyl peroxide 5% wash, tazarotene 0.1% gel  2. Tinea cruris  -fluconazole 150mg qweek, terbinafine 1% cream BID  3. Seborrheic dermatitis  -Head & Shoulders and [place]    Encounter Date: Aug 10, 2020    CC:   Chief Complaint   Patient presents with     Derm Problem     Tinea cruris and acne - Aniceto states his acne has worsened. Along with his tinea cruris.        History of Present Illness:  Aniceto Issa is a 19 year old male who presents for follow up.    At last visit - both acne and tinea cruris cleared up   - both tend to flare seasonally - both restarted a few weeks ago   - tried some OTC antifungals a few weeks ago without improvement   - more cystic acne on the back and abdomen    - exacerbated by exercise and friction     - worsening over last few months   - was taking doxycycline - helpful in the past    ROS:   General: no fevers, chills, or weight loss  Skin: as per HPI    Physical Examination:  General: Well-appearing, appropriately-developed individual. Alert and oriented in a pleasant mood.  Skin: Focused examination including back was performed.   -numerous erythematous acneiform papules with scarring on the back      Past Medical History:   Patient Active Problem List   Diagnosis     Acne vulgaris     No past medical history on file.  No past surgical history on file.    Social History:  Patient reports that he has never smoked. He has never used smokeless tobacco.    Family History:  Family History   Problem Relation Age of Onset     Melanoma Maternal Grandfather      Skin Cancer No family hx of        Medications:  Current Outpatient Medications   Medication     TAZORAC 0.1 % external gel     benzoyl peroxide 5 % external liquid     doxycycline hyclate (VIBRA-TABS) 100 MG tablet      fluconazole (DIFLUCAN) 150 MG tablet     terbinafine (LAMISIL) 1 % external cream     No current facility-administered medications for this visit.           No Known Allergies        Impression and Recommendations (Patient Counseled on the Following):  1. Cystic acne vulgaris/acne mechanica: had good results with doxycycline, tazarotene, benzoyl peroxide in the past, but stopped treatment and acne recurred. Will restart.  -Doxycycline 100 mg twice daily  -Tazarotene 0.1% cream at bedtime  -Benzoyl peroxide 5% wash daily    2. Tinea cruris: resistant to topicals alone in past and again this episode; previously responded to fluconazole 150 mg x2 doses  -Fluconazole 150 mg weekly x2 doses  -Terbinafine 1% cream BID      Follow-up:   Follow-up with dermatology in approximately 3 months. Earlier for new or changing lesions or rash.      Staff only    Aniceto Adams MD   of Dermatology  Department of Dermatology  [facility]    _____________________________________________________________________________    Teledermatology information:  - Location of patient: [place]  - Patient presented as: return  - Location of teledermatologist:  ([facility] DERMATOLOGY )  - Reason teledermatology is appropriate:  of National Emergency Regarding Coronavirus disease (COVID 19) Outbreak  - Image quality and interpretability: acceptable  - Physician has received verbal consent for a Video/Photos Visit from the patient? Yes  - In-person dermatology visit recommendation: no  - Date of images: 8/10/2020  - Service start time: 7:52  - Service end time: 8:04  - Date of report: 8/10/2020    Diagnosis/Prognosis/MOLST Discussed/Treatment Options

## 2024-02-23 DIAGNOSIS — L40.9 PSORIASIS: ICD-10-CM

## 2024-02-23 NOTE — TELEPHONE ENCOUNTER
Triamcinolone 0.1% cream  Last Written Prescription Date:  5/11/23  Last Fill Quantity: 45 g,  # refills: 1   Last office visit: 5/11/2023 ; last virtual visit: Visit date not found with prescribing provider:  Chavez Guzman   Future Office Visit:  none

## 2024-02-26 RX ORDER — TRIAMCINOLONE ACETONIDE 1 MG/G
CREAM TOPICAL 2 TIMES DAILY
Qty: 45 G | Refills: 1 | Status: SHIPPED | OUTPATIENT
Start: 2024-02-26

## 2024-02-26 NOTE — TELEPHONE ENCOUNTER
Prescription approved per [facility] Refill Protocol.    Nikki SMALLS RN  [facility] Dermat[facility] [place]  [phone]

## 2024-03-18 NOTE — PROGRESS NOTES
"Date: 2020 11:39:56  Clinician: Cornel Lewis  Clinician NPI: [id number]  Patient: Aniceto Issa  Patient : 2000  Patient Address: [address] [place] [address]  Patient Phone: [phone]  Visit Protocol: URI  Patient Summary:  Aniceto is a 20 year old ( : 2000 ) male who initiated a [facility] Visit for COVID-19 (Coronavirus) evaluation and screening. When asked the question \"Please sign me up to receive news, health information and promotions. \", Aniceto responded \"Yes\".    When asked when his symptoms started, Aniceto reported that he does not have any symptoms.   He denies taking antibiotic medication in the past month and having recent facial or sinus surgery in the past 60 days.    Pertinent COVID-19 (Coronavirus) information  Aniceto does not work or volunteer as healthcare worker or a . In the past 14 days, Aniceto has not worked or volunteered at a healthcare facility or group living setting.   In the past 14 days, he has lived in a congregate living setting.   Aniceto has not had a close contact with a laboratory-confirmed COVID-19 patient within the last 14 days.    Since 2019, Aniceto has been tested for COVID-19 and has not had upper respiratory infection or influenza-like illness.      Result of COVID-19 test: Negative     Date of his COVID-19 test: 2020      Pertinent medical history  Aniceto does not need a return to work/school note.   Weight: 195 lbs   Aniceto does not smoke or use smokeless tobacco.   Weight: 195 lbs  Reason for repeat visit for the same protocol within 24 hours:  I need to schedule a covid 19 test between  and   See the History of referred by protocol and completed visits section for details on previous visits (visits currently in queue to be diagnosed will not appear in this section).    MEDICATIONS: terbinafine HCl oral, ALLERGIES: NKDA  Clinician Response:  Dear Aniceto,   Based on your exposure to COVID-19 " (coronavirus), we would like to test you for this virus.  1. Please call [phone] to schedule your visit. Explain that you were referred by [facility] to have a COVID-19 test. Be ready to [facility].   The following will serve as your written order for this COVID Test, ordered by me, for the indication of suspected COVID [Z20.828]: The test will be ordered in [employer], our electronic health record, after you are scheduled. It will show as ordered and authorized by Jerod Rose MD.  Order: COVID-19 (coronavirus) PCR for ASYMPTOMATIC EXPOSURE testing from [facility].   If you know you have had close contact with someone who tested positive, you should be quarantined for 14 days after this exposure. You should stay in quarantine for the14 days even if the covid test is negative, the optimal time to test after exposure is 5-7 days from the exposure  Quarantine means   What should I do?  For safety, it's very important to follow these rules. Do this for 14 days after the date you were last exposed to the virus..  Stay home and away from others. Don't go to school or anywhere else. Generally quarantine means staying home from work but there are some exceptions to this. Please contact your workplace.   No hugging, kissing or shaking hands.  Don't let anyone visit.  Cover your mouth and nose with a mask, tissue or washcloth to avoid spreading germs.  Wash your hands and face often. Use soap and water.  What are the symptoms of COVID-19?  The most common symptoms are cough, fever and trouble breathing. Less common symptoms include headache, body aches, fatigue (feeling very tired), chills, sore throat, stuffy or runny nose, diarrhea (loose poop), loss of taste or smell, belly pain, and nausea or vomiting (feeling sick to your stomach or throwing up).  After 14 days, if you have still don't have symptoms, you likely don't have this virus.  If you develop symptoms, follow these guidelines.  If you're normally  healthy: Please start another [facility] visit to report your symptoms. Go to [email or web address].  If you have a serious health problem (like cancer, heart failure, an organ transplant or kidney disease): Call your specialty clinic. Let them know that you might have COVID-19.  2. When it's time for your COVID test:  Stay at least 6 feet away from others. (If someone will drive you to your test, stay in the backseat, as far away from the  as you can.)  Cover your mouth and nose with a mask, tissue or washcloth.  Go straight to the testing site. Don't make any stops on the way there or back.  Please note  Caregivers in these groups are at risk for severe illness due to COVID-19:  o People 65 years and older  o People who live in a nursing home or long-term care facility  o People with chronic disease (lung, heart, cancer, diabetes, kidney, liver, immunologic)  o People who have a weakened immune system, including those who:  Are in cancer treatment  Take medicine that weakens the immune system, such as corticosteroids  Had a bone marrow or organ transplant  Have an immune deficiency  Have poorly controlled HIV or AIDS  Are obese (body mass index of 40 or higher)  Smoke regularly  Where can I get more information?  [facility] -- About COVID-19: [email or web address]  CDC -- What to Do If You're Sick: www.cdc.gov/coronavirus/2019-ncov/about/steps-when-sick.html  CDC -- Ending Home Isolation: www.cdc.gov/coronavirus/2019-ncov/hcp/disposition-in-home-patients.html  CDC -- Caring for Someone: www.cdc.gov/coronavirus/2019-ncov/if-you-are-sick/care-for-someone.html  [facility] -- Interim Guidance for Hospital Discharge to Home: www.health.[facility].[place]/diseases/coronavirus/hcp/hospdischarge.pdf  [facility] clinical trials (COVID-19 research studies): clinicalaffairs.[facility]/umn-clinical-trials  Below are the COVID-19 hotlines at the [place] [facility] [employer] ([facility]). Interpreters are available.  [facility]  questions: Call 293-396-1704 or 1-620.846.4780 (7 a.m. to 7 p.m.)  For questions about schools and childcare: Call 703-049-5244 or 1-310.121.1720 (7 a.m. to 7 p.m.)    Diagnosis: Contact with and (suspected) exposure to other viral communicable diseases  Diagnosis ICD: Z20.828   187.96

## 2024-07-14 ENCOUNTER — HEALTH MAINTENANCE LETTER (OUTPATIENT)
Age: 24
End: 2024-07-14

## 2025-07-19 ENCOUNTER — HEALTH MAINTENANCE LETTER (OUTPATIENT)
Age: 25
End: 2025-07-19